# Patient Record
Sex: FEMALE | Race: WHITE | NOT HISPANIC OR LATINO
[De-identification: names, ages, dates, MRNs, and addresses within clinical notes are randomized per-mention and may not be internally consistent; named-entity substitution may affect disease eponyms.]

---

## 2023-03-31 PROBLEM — Z00.00 ENCOUNTER FOR PREVENTIVE HEALTH EXAMINATION: Status: ACTIVE | Noted: 2023-03-31

## 2023-05-02 ENCOUNTER — APPOINTMENT (OUTPATIENT)
Dept: GASTROENTEROLOGY | Facility: CLINIC | Age: 53
End: 2023-05-02
Payer: COMMERCIAL

## 2023-05-02 ENCOUNTER — APPOINTMENT (OUTPATIENT)
Dept: GASTROENTEROLOGY | Facility: CLINIC | Age: 53
End: 2023-05-02

## 2023-05-02 ENCOUNTER — NON-APPOINTMENT (OUTPATIENT)
Age: 53
End: 2023-05-02

## 2023-05-02 VITALS
SYSTOLIC BLOOD PRESSURE: 120 MMHG | BODY MASS INDEX: 26.58 KG/M2 | DIASTOLIC BLOOD PRESSURE: 70 MMHG | WEIGHT: 150 LBS | HEIGHT: 63 IN

## 2023-05-02 DIAGNOSIS — R74.8 ABNORMAL LEVELS OF OTHER SERUM ENZYMES: ICD-10-CM

## 2023-05-02 DIAGNOSIS — Z12.11 ENCOUNTER FOR SCREENING FOR MALIGNANT NEOPLASM OF COLON: ICD-10-CM

## 2023-05-02 DIAGNOSIS — K21.9 GASTRO-ESOPHAGEAL REFLUX DISEASE W/OUT ESOPHAGITIS: ICD-10-CM

## 2023-05-02 PROCEDURE — 99204 OFFICE O/P NEW MOD 45 MIN: CPT

## 2023-05-02 NOTE — ASSESSMENT
[FreeTextEntry1] : Will plan on an upper endoscopy for GERD.  Explained risks/benefits/alternatives including not limited to bleeding, infection, perforation, missed lesions, anesthesia complications.  Patient understands and agrees, all questions answered.\par \par Will plan on an endoscopic ultrasound for elevated liver enzymes, r/o choledocholithiasis.  Explained risks/benefits/alternatives including not limited to bleeding, infection, perforation, missed lesion, injury to internal organs, pancreatitis.  Patient understands and agrees, all questions answered.\par \par Will need a dedicated eval of her GB, biliary anatomy during her EUS.\par \par Will plan on a colonoscopy for screening.  Explained risks/benefits/alternatives including not limited to bleeding, infection, perforation, missed lesions, anesthesia complications.  Patient understands and agrees, all questions answered.  Will use a split dose miralax/gatorade prep with clears the day prior.\par \par Will refer to colorectal surgery for evaluation and treatment of her hemorrhoids.\par \par Thank you for referring Ms. GODDARD.  Please do not hesitate to call to further discuss his/her care.\par \par Note faxed to requesting MD.\par \par

## 2023-05-02 NOTE — HISTORY OF PRESENT ILLNESS
[FreeTextEntry1] : Ms. Mabry is a pleasant 53F h/o vitiligo who is referred by Dr. Morgan for abd pain, elevated liver enzymes.\par \par She was having upper abd pain and N/V with associated diarrhea 5-6 weeks ago, symptoms persisted for around 5 days.  No fevers, chills at that time. Did have "dark" stool that could have been black for a short period. No red blood.  No jaundice, icterus.\par \par Symptoms resolved on their own.\par \par Was not taking any NSAIDs.\par \par Had labs on 3/27:\par Normal CBC\par Normal amylase, lipase\par TB 0.9\par AST 79\par \par Alk phos 120\par \par Repeat labs on 4/4 showed normalization of her elevated liver enzymes (see scanned results).\par \par During this time she had an abd U/S, images and report reviewed showing only GB sludge.\par \par After her diarrhea she had an exacerbation of her int hemorrhoids, were protruding, have since returned to normal.\par \par Has been having heartburn, no regurgitation.\par \par Has never undergone and EGD or colonoscopy.\par \par Does not smoke or drink.\par \par Sister had gallstones, no FHx of any GI malignancies.

## 2023-06-01 ENCOUNTER — RESULT REVIEW (OUTPATIENT)
Age: 53
End: 2023-06-01

## 2023-06-02 ENCOUNTER — APPOINTMENT (OUTPATIENT)
Dept: GASTROENTEROLOGY | Facility: HOSPITAL | Age: 53
End: 2023-06-02

## 2023-06-02 ENCOUNTER — RESULT REVIEW (OUTPATIENT)
Age: 53
End: 2023-06-02

## 2023-06-07 ENCOUNTER — NON-APPOINTMENT (OUTPATIENT)
Age: 53
End: 2023-06-07

## 2023-06-08 DIAGNOSIS — K64.8 OTHER HEMORRHOIDS: ICD-10-CM

## 2023-06-08 DIAGNOSIS — K62.1 RECTAL POLYP: ICD-10-CM

## 2023-06-09 ENCOUNTER — APPOINTMENT (OUTPATIENT)
Dept: COLORECTAL SURGERY | Facility: CLINIC | Age: 53
End: 2023-06-09
Payer: COMMERCIAL

## 2023-06-09 ENCOUNTER — NON-APPOINTMENT (OUTPATIENT)
Age: 53
End: 2023-06-09

## 2023-06-09 VITALS
BODY MASS INDEX: 26.05 KG/M2 | SYSTOLIC BLOOD PRESSURE: 141 MMHG | DIASTOLIC BLOOD PRESSURE: 80 MMHG | HEART RATE: 73 BPM | HEIGHT: 63 IN | WEIGHT: 147 LBS

## 2023-06-09 DIAGNOSIS — K64.4 RESIDUAL HEMORRHOIDAL SKIN TAGS: ICD-10-CM

## 2023-06-09 DIAGNOSIS — K64.2 THIRD DEGREE HEMORRHOIDS: ICD-10-CM

## 2023-06-09 PROCEDURE — 99203 OFFICE O/P NEW LOW 30 MIN: CPT | Mod: 25

## 2023-06-09 PROCEDURE — 46600 DIAGNOSTIC ANOSCOPY SPX: CPT

## 2023-06-09 NOTE — ASSESSMENT
[FreeTextEntry1] : 53-year-old with both external hemorrhoid skin tag and a left lateral pedunculated prolapsing internal hemorrhoid structure.\par \par Discussed with the patient the safest approach would be an operative environment where the patient will be pain-free and I can do all appropriate surgical procedures without risk of bleeding or excessive pain postoperatively.  I provided the patient written instructions on postoperative recovery.\par This will be a limited hemorrhoidectomy with removal of the left lateral pedunculated prolapsing tissue as well as the removal of the skin tags.  I discussed with the patient the intention to leave the normal other hemorrhoidal tissue in place if there is no indication for operative removal.  She is in agreement.

## 2023-06-09 NOTE — CONSULT LETTER
[Dear  ___] : Dear ~MADYSON, [( Thank you for referring [unfilled] for consultation for _____ )] : Thank you for referring [unfilled] for consultation for [unfilled] [Please see my note below.] : Please see my note below. [Consult Closing:] : Thank you very much for allowing me to participate in the care of this patient.  If you have any questions, please do not hesitate to contact me. [Sincerely,] : Sincerely, [FreeTextEntry2] : Donald Almanza MD\brigid Morgan MD [FreeTextEntry1] : Please see the specifics within my note.  The left lateral pedunculated prolapsing internal hemorrhoid structure involves the region of the dentate line and therefore is best removed in the operative setting.\par \par I discussed with Jennifer the concerns I have with strictly office management and the opportunity for banding to be a both painful experience for the next 3 to 5 days as well as possibly precipitating thrombosis of the associated external hemorrhoid in this area.\par \par I recommend outpatient operative management.  She is in agreement.  We are moving forward with surgical scheduling. [FreeTextEntry3] : .Signature [DrDanilo  ___] : Dr. CUNNINGHAM [DrDanilo ___] : Dr. CUNNINGHAM

## 2023-06-09 NOTE — PHYSICAL EXAM
[FreeTextEntry1] : Anorectal examination includes visual, digital rectal exam, anoscopic exam.\par \par There are 2 small skin tags 1 in the posterior left anal margin verge and another 1 along the right anterior external hemorrhoid skin tag location.\par Digital rectal exam is normal sphincter tone without palpable mass.\par Anoscopic exam reveals a left lateral pedunculated internal hemorrhoidal structure.  The base of this pedunculated structure is involving the dentate line mid anal canal region.  In working with the patient it is evident that the base of this structure is sensate and therefore the application of a rubber band in the office setting carries the risk of significant postprocedure pain and discomfort.

## 2023-06-09 NOTE — HISTORY OF PRESENT ILLNESS
[FreeTextEntry1] : Jennifer meneses is a very pleasant 53-year-old female who recently underwent a normal colonoscopy with Dr. Almanza.\par \par She reports that since the birth of her child 15 years ago she has had some chronic ongoing hemorrhoidal issues.  Specifically she reports a prolapsing tissue regularly intermittently occurring with bowel movements, requiring manual reduction during clean up.\par \par She also has some skin tags which have been episodically uncomfortable and tender.  Otherwise she denies regular daily active anal pain with bowel movements.  She only very occasionally sees some small visible bleeding with clean up.

## 2023-06-09 NOTE — REASON FOR VISIT
[Initial Evaluation] : an initial evaluation [FreeTextEntry1] : 53-year-old female presenting for evaluation and management of chronic hemorrhoidal symptoms

## 2023-09-15 ENCOUNTER — APPOINTMENT (OUTPATIENT)
Dept: AFTER HOURS CARE | Facility: EMERGENCY ROOM | Age: 53
End: 2023-09-15
Payer: COMMERCIAL

## 2023-09-15 DIAGNOSIS — J32.9 CHRONIC SINUSITIS, UNSPECIFIED: ICD-10-CM

## 2023-09-15 PROCEDURE — 99204 OFFICE O/P NEW MOD 45 MIN: CPT | Mod: 95

## 2023-09-15 RX ORDER — METHYLPREDNISOLONE 4 MG/1
4 TABLET ORAL
Qty: 1 | Refills: 0 | Status: ACTIVE | COMMUNITY
Start: 2023-09-15 | End: 1900-01-01

## 2023-09-15 RX ORDER — AZITHROMYCIN 250 MG/1
250 TABLET, FILM COATED ORAL
Qty: 1 | Refills: 0 | Status: ACTIVE | COMMUNITY
Start: 2023-09-15 | End: 1900-01-01

## 2023-10-18 ENCOUNTER — APPOINTMENT (OUTPATIENT)
Dept: COLORECTAL SURGERY | Facility: HOSPITAL | Age: 53
End: 2023-10-18

## 2025-05-05 ENCOUNTER — APPOINTMENT (OUTPATIENT)
Dept: COLORECTAL SURGERY | Facility: CLINIC | Age: 55
End: 2025-05-05
Payer: COMMERCIAL

## 2025-05-05 VITALS
WEIGHT: 149 LBS | HEART RATE: 78 BPM | DIASTOLIC BLOOD PRESSURE: 85 MMHG | BODY MASS INDEX: 26.4 KG/M2 | OXYGEN SATURATION: 98 % | SYSTOLIC BLOOD PRESSURE: 137 MMHG | HEIGHT: 63 IN | RESPIRATION RATE: 16 BRPM

## 2025-05-05 DIAGNOSIS — K60.0 ACUTE ANAL FISSURE: ICD-10-CM

## 2025-05-05 DIAGNOSIS — K64.2 THIRD DEGREE HEMORRHOIDS: ICD-10-CM

## 2025-05-05 DIAGNOSIS — K62.89 OTHER SPECIFIED DISEASES OF ANUS AND RECTUM: ICD-10-CM

## 2025-05-05 PROCEDURE — 99213 OFFICE O/P EST LOW 20 MIN: CPT
